# Patient Record
Sex: MALE | Race: BLACK OR AFRICAN AMERICAN | URBAN - METROPOLITAN AREA
[De-identification: names, ages, dates, MRNs, and addresses within clinical notes are randomized per-mention and may not be internally consistent; named-entity substitution may affect disease eponyms.]

---

## 2023-11-04 ENCOUNTER — EMERGENCY (EMERGENCY)
Facility: HOSPITAL | Age: 43
LOS: 1 days | Discharge: HOME | End: 2023-11-04
Attending: EMERGENCY MEDICINE
Payer: COMMERCIAL

## 2023-11-04 VITALS
HEIGHT: 67 IN | SYSTOLIC BLOOD PRESSURE: 130 MMHG | TEMPERATURE: 98 F | RESPIRATION RATE: 20 BRPM | HEART RATE: 78 BPM | WEIGHT: 184.97 LBS | OXYGEN SATURATION: 100 % | DIASTOLIC BLOOD PRESSURE: 70 MMHG

## 2023-11-04 PROCEDURE — 99284 EMERGENCY DEPT VISIT MOD MDM: CPT

## 2023-11-04 PROCEDURE — 96372 THER/PROPH/DIAG INJ SC/IM: CPT

## 2023-11-04 PROCEDURE — 99283 EMERGENCY DEPT VISIT LOW MDM: CPT

## 2023-11-04 RX ORDER — METHOCARBAMOL 500 MG/1
1500 TABLET, FILM COATED ORAL ONCE
Refills: 0 | Status: DISCONTINUED | OUTPATIENT
Start: 2023-11-04 | End: 2023-11-04

## 2023-11-04 RX ORDER — KETOROLAC TROMETHAMINE 30 MG/ML
30 SYRINGE (ML) INJECTION ONCE
Refills: 0 | Status: DISCONTINUED | OUTPATIENT
Start: 2023-11-04 | End: 2023-11-04

## 2023-11-04 RX ORDER — DIAZEPAM 5 MG
5 TABLET ORAL ONCE
Refills: 0 | Status: DISCONTINUED | OUTPATIENT
Start: 2023-11-04 | End: 2023-11-04

## 2023-11-04 RX ORDER — LIDOCAINE 4 G/100G
1 CREAM TOPICAL
Qty: 1 | Refills: 0
Start: 2023-11-04 | End: 2023-11-08

## 2023-11-04 RX ORDER — LIDOCAINE 4 G/100G
1 CREAM TOPICAL ONCE
Refills: 0 | Status: COMPLETED | OUTPATIENT
Start: 2023-11-04 | End: 2023-11-04

## 2023-11-04 RX ORDER — DIAZEPAM 5 MG
2 TABLET ORAL ONCE
Refills: 0 | Status: DISCONTINUED | OUTPATIENT
Start: 2023-11-04 | End: 2023-11-04

## 2023-11-04 RX ORDER — ACETAMINOPHEN 500 MG
975 TABLET ORAL ONCE
Refills: 0 | Status: COMPLETED | OUTPATIENT
Start: 2023-11-04 | End: 2023-11-04

## 2023-11-04 RX ORDER — IBUPROFEN 200 MG
1 TABLET ORAL
Qty: 12 | Refills: 0
Start: 2023-11-04 | End: 2023-11-06

## 2023-11-04 RX ORDER — METHOCARBAMOL 500 MG/1
1 TABLET, FILM COATED ORAL
Qty: 12 | Refills: 0
Start: 2023-11-04 | End: 2023-11-06

## 2023-11-04 RX ADMIN — Medication 5 MILLIGRAM(S): at 10:15

## 2023-11-04 RX ADMIN — LIDOCAINE 1 PATCH: 4 CREAM TOPICAL at 10:16

## 2023-11-04 RX ADMIN — Medication 30 MILLIGRAM(S): at 10:17

## 2023-11-04 RX ADMIN — Medication 975 MILLIGRAM(S): at 10:15

## 2023-11-04 NOTE — ED ADULT NURSE NOTE - NSFALLUNIVINTERV_ED_ALL_ED
Bed/Stretcher in lowest position, wheels locked, appropriate side rails in place/Call bell, personal items and telephone in reach/Instruct patient to call for assistance before getting out of bed/chair/stretcher/Non-slip footwear applied when patient is off stretcher/[place] to call system/Physically safe environment - no spills, clutter or unnecessary equipment/Purposeful proactive rounding/Room/bathroom lighting operational, light cord in reach

## 2023-11-04 NOTE — ED PROVIDER NOTE - OBJECTIVE STATEMENT
44 y/o M with PMHx chronic back pain who presents to the ED for worsening lumbar back pain since 0130 last night. 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 States that this pain is not new and comes/goes. States that he has not seen any pain or spine doctors. States that he was lifting heavy packages this morning when the pain worsened so he decided to come here. Denies any saddle anesthesia, urinary/bowel incontinence, or any other complaints at this time. Denies any falls or other traumas.

## 2023-11-04 NOTE — ED PROVIDER NOTE - CARE PROVIDER_API CALL
Aaron Munoz  Orthopaedic Surgery  [address] [place] [id number]  Phone: [phone]  Fax: [phone]  Follow Up Time:

## 2023-11-04 NOTE — ED PROVIDER NOTE - PHYSICAL EXAMINATION
General: well appearing, NAD  Head: NC, AT  EENT: no scleral icterus  Cardiac: no lower extremity edema  Respiratory: no respiratory distress   Abdomen: ND  MSK/Vascular: paralumbar ttp  Neuro: sensation to light touch intact  Psych: calm, cooperative

## 2023-11-04 NOTE — ED PROVIDER NOTE - ATTENDING CONTRIBUTION TO CARE
97.2 I performed the initial face to face bedside interview with this patient regarding history of present illness, review of symptoms and relevant past medical, social and family history.  I completed an independent physical examination.  I was the initial provider who evaluated this patient. I have signed out the follow up of any pending tests (i.e. labs, radiological studies) to the resident.  I have communicated the patient’s plan of care and disposition with the resident.

## 2023-11-04 NOTE — ED ADULT TRIAGE NOTE - CHIEF COMPLAINT QUOTE
" I have lower back pain for years but worsening today at 0130" pt denies falling or trauma to back. pt able to ambulate, no bowel or urine incontinent.

## 2023-11-04 NOTE — ED PROVIDER NOTE - NS ED ROS FT
Constitutional: no fever, no chills  Head: NC, AT  Eyes: no redness  ENMT: no nasal congestion/drainage  CV: no chest pain, no edema  Resp: no cough, no dyspnea  GI: no abdominal pain, no nausea, no vomiting  : no dysuria  Skin: no lesions, no rashes   Neuro: no LOC, no headache, no sensory deficits

## 2023-11-04 NOTE — ED ADULT NURSE NOTE - OBJECTIVE STATEMENT
Pt A&Ox4 arrives to ED complaining of lower medial back pain that started this morning when he woke up for work. Pt is able to move all four extremities. Sensory intact. Medicated as ordered. Bed locked in lowest position. Frequent checks made.

## 2023-11-04 NOTE — ED PROVIDER NOTE - PROGRESS NOTE DETAILS
Patient feels much improved after meds. Comfortable and in no acute distress. Stable for d/c with PMD/ortho f/u and return precautions. - Sanjuana

## 2023-11-04 NOTE — ED PROVIDER NOTE - CLINICAL SUMMARY MEDICAL DECISION MAKING FREE TEXT BOX
44 y/o M with PMHx chronic back pain who presents to the ED for worsening lumbar back pain since 0130 last night. Will tx with multimodal pain control and reassess.

## 2023-11-04 NOTE — ED PROVIDER NOTE - PATIENT PORTAL LINK FT
You can access the FollowMyHealth Patient Portal offered by [facility] by registering at the following website: [email or web address]. By joining [email or web address]’s FollowMyHealth portal, you will also be able to view your health information using other applications (apps) compatible with our system.